# Patient Record
(demographics unavailable — no encounter records)

---

## 2025-03-05 NOTE — CARDIOLOGY SUMMARY
[de-identified] : 3/5/25: nsr, nonspecific ST and T wave changes 9/4/24: nsr, nonspecific ST and T wave changes 6/3/24: nsr, nonspecific ST and T wave changes 6/2/23: nsr, nonspecific T wave changes 1/10/24: nsr, nonspecific T wave changes 10/4/23: nsr, nonspecific T wave changes 8/23/23: nsr, nonspecific T wave changes 5/22/23: nsr, nonspecific T wave changes [de-identified] : 10/2/23: 1% PVCs [de-identified] : 8/21/23: negative for ischemia [de-identified] : 1/22/24: 1. Left ventricular cavity is normal. Left ventricular systolic function is normal with an ejection fraction of 67 % by Odonnell's method of disks. 2. Normal right ventricular cavity size and normal systolic function. 3. No significant valvular disease. 4. No prior echocardiogram is available for comparison.  4/4/23: 1. LV Ejection Fraction by Odonnell's Method with a biplane EF of 64 %.  2. Normal global left ventricular systolic function.  3. Trace mitral valve regurgitation.  4. Normal left atrial size.  5. Normal right atrial size. [de-identified] : 4/3/23: There is significant 2 vessel CAD. 80% prox LCx s/p successful IVUS guided PCI with balloon angioplasty and SMITH X 1. 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  [de-identified] : 2/7/24: 1. Right: proximal ICA no stenosis. 2. Left: proximal ICA no stenosis. 3. Vertebral arteries: antegrade flow bilaterally. 4. Subclavian arteries: no significant atherosclerosis bilaterally.

## 2025-03-05 NOTE — HISTORY OF PRESENT ILLNESS
[FreeTextEntry1] : JOE MEI is a 52 year old woman with CAD s/p Synergy XD 3.0 x 28 to pLCx on 8/30/21 and Synergy 3.0 x 12 to pLCx on 4/03/23, HTN, NIDDM, and HLD who presents for follow up today.  She has had multiple recent ER visits. Some relevant history is below:   When patient was admitted in 8/29/2021, her home antihypertensive medication was losartan 100 mg daily. After stent placement, she was discharged on the same dose of losartan as well as carvedilol and amlodipine. Per the , this is when her dizziness started. Her amlodipine was discontinued, and patient was changed to metoprolol and losartan and her dizziness resolved.   Patient represented in 4/2023 with unstable angina, underwent PCI to pLCx.  Per the , patient started taking isosorbide and redeveloped dizziness. The isosorbide was discontinued on 4/07/23 and patient was put on nifedipine 30 mg daily. On nifedipine 30 mg daily, BP was still uncontrolled and patient was still dizzy. Losartan was changed from 50 mg daily to losartan/HCTZ 100-25 mg daily, and Toprol was increased from 50 mg to 100 mg daily. Patient was also started on Ranexa 500 mg BID. After the last adjustments on 4/11/23, patient's BP became controlled but the dizziness persisted. The lowest HR and BP measured at home has been HR 70s and /65.   On 4/16/23, her dizziness was even worse, and she also developed pain in her left arm, prompting her to present to [facility] ED. Her orthostatics were negative. She was ruled out for ACS and discharged to follow up.   She went to the hospital on 8/20/23 for palpitations and syncope. She was ruled out for ACS. She had a normal echo and stress test and was discharged to follow up.   She has been to the ER twice in March 2024 for chest pain, shortness of breath, palpitations. She has been ruled out for ACS. An extensive workup has been done including a V/Q scan to rule out PE, which was negative.   Today, she is not feeling well at all. Her blood pressure has been in the systolic 150s at home. When this happens, she gets very anxious. She feels that the vein in her neck is going to explode. She also gets leg swelling. No chest pain or shortness of breath. No palpitations or syncope.

## 2025-03-05 NOTE — ASSESSMENT
[FreeTextEntry1] : 52 year old woman with CAD s/p Synergy XD 3.0 x 28 to pLCx on 8/30/21 and Synergy 3.0 x 12 to pLCx on 4/03/23, HTN, NIDDM, and HLD who presents for follow up today.  1. CAD: s/p Synergy XD 3.0 x 28 to pLCx on 8/30/21 and Synergy 3.0 x 12 to pLCx on 4/03/23. Her symptoms currently are not consistent with angina. She has residual RPL disease, 95%, however, currently has non-anginal sounding symptoms. - Continue aspirin 81mg lifelong - Continue atorvastatin and zetia; LDL goal is <70 - Continue metoprolol succinate 50mg daily for anti-anginal therapy  2. HTN: BP at goal today and but above goal at home and labile. Her goal is <130/80 mmHg. - Increase Losartan to 25mg BID - Recommended to keep a BP log at home and call me if BP is consistently above goal  3. HLD: LDL is at goal of <70. Continue atorva and zetia. Her LFTs are slightly high but will monitor for now.   The secondary prevention of heart disease was discussed in detail with the patient, including adhering to a heart healthy, plant based, or Mediterranean diet, and the importance of 30 minutes of moderate intensity activity for 30 minutes, 5 times a week. All the patient's questions were answered.  Have advised the patient and her son that if she exhibits signs and symptoms of a heart attack, she should go to the ER. They verbalized understanding.  RTC in 4-6 weeks.

## 2025-04-30 NOTE — HISTORY OF PRESENT ILLNESS
[de-identified] : Ms. JOE MEI is a 52 year  old woman. She presented to the office for the first time on 04/30/2025 , her primary is DAVID MICHEL .  tele phone visit gi symptoms has been there for many years us no gallstoens hida scan good ef gastroparesis  "She has a history of diabetes mellitus and then has also been diagnosed with gastroparesis and initially she was on Reglan and then subsequently as it was not working and has undergone EGD with Botox injection which helped her. Lately she was on on and off on Ozempic due to diabetes mellitus. Diabetes had flared up after back injections and for the past 4 years she has been off and on Ozempic. 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 I did a EGD with Botox injection in the prepyloric area which helped her transiently and then she has recurrence of her symptoms. She was evaluated in the hospital again and imaging was unimpressive. She was started on Reglan and nortriptyline and was discharged home. As per the  she became very lethargic with nortriptyline 25 mg dose and he tried to even reduce the dose but she was not better with that. She is on PPI in the morning and she takes liquid Reglan. I have reviewed all the imaging."  also had foot pain

## 2025-04-30 NOTE — ASSESSMENT
[FreeTextEntry1] : Ms. JOE MEI is a 52 year  old woman. She presented to the office for the first time on 04/30/2025 , her primary is DAVID MICHEL .  tele phone visit gi symptoms has been there for many years us no gallstoens hida scan good ef gastroparesis  "She has a history of diabetes mellitus and then has also been diagnosed with gastroparesis and initially she was on Reglan and then subsequently as it was not working and has undergone EGD with Botox injection which helped her. Lately she was on on and off on Ozempic due to diabetes mellitus. Diabetes had flared up after back injections and for the past 4 years she has been off and on Ozempic. 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 I did a EGD with Botox injection in the prepyloric area which helped her transiently and then she has recurrence of her symptoms. She was evaluated in the hospital again and imaging was unimpressive. She was started on Reglan and nortriptyline and was discharged home. As per the  she became very lethargic with nortriptyline 25 mg dose and he tried to even reduce the dose but she was not better with that. She is on PPI in the morning and she takes liquid Reglan. I have reviewed all the imaging."  also had foot pain   impression : vague gi i pain   gastroparesis vs goo will get ugi with small bowel series  We explained in great detail the pathophysiology of the disease process. We don diagrams and discussed the workup for diagnosis and management. The various options were explained to the patient. The Risk , benefit and alternatives were discussed. We discussed recovery and possible complications. The Post operative care was explained to the patient. She was counselled on diet , exercise and wound care. We discussed the pathology and surgery with her.  Counselling: The issue of increased BMI and weight was explained to the patient. We discussed how reducing weight before surgery can improve the outcomes of surgery. The surgery is more precise, less blood loss, complication and duration. The recovery is also easier, with reduced wound complications including infection.  She was counselled on diet and exercise.  We discussed the pathology and surgery with her.   We discussed for [id number], including her present medical conditions, medications and if they need to be changed, the medications she is on and various surgical and non-surgical options. This time excludes any separably reportable services (and /or teaching). The Risk, benefit and alternatives were discussed. We discussed recovery and possible complications. her radiological images and labs were independently reviewed in the PACS by me. The Images were reviewed with her.   We discussed the importance of close follow up. We informed that she needs to follow up after test . We also informed that she can call us if anything changes or has any questions.     Chinmay Nichole MD Minimally Invasive Surgery Foregut and [id number]-Sbflqrt Surgery  [facility]. [address], [address], [place] [address] Phone: [phone] Fax: [phone]

## 2025-07-23 NOTE — ASSESSMENT
[FreeTextEntry1] : 54 y/o F presents for LLE pain on ambulation, had arterial duplex that showed no significant arterial disease in the LLE. She has been doing physical therapy and symptoms are improved now.  Duplex reviewed. No vascular surgical intervention. Symptoms likely musculoskeletal. F/u as needed.   I, Dr. Neville, personally performed the evaluation and management (E/M) services for this established patient who presents today with (a) new problem(s)/exacerbation of (an) existing condition(s). That E/M includes conducting the clinically appropriate interval history &/or exam, assessing all new/exacerbated conditions, and establishing a new plan of care. Today, my STANFORD, Elisha], was here to observe my evaluation and management service for this new problem/exacerbated condition and follow the plan of care established by me going forward.  Thank you for allowing me to participate in the care of your patient.   Sincerely,   Cheng Neville MD, RPVI, FACS Associate Professor of Surgery , [facility] Director of Limb Salvage Surgery [facility] [employer] [facility] at hospitals/[facility]

## 2025-07-23 NOTE — HISTORY OF PRESENT ILLNESS
[FreeTextEntry1] : 52 y/o F presents for LLE pain on ambulation, had arterial duplex that showed no significant arterial disease in the LLE. She has been doing physical therapy and symptoms are improved now.

## 2025-07-23 NOTE — PHYSICAL EXAM
[2+] : left 2+ [Varicose Veins Of Lower Extremities] : bilaterally [Ankle Swelling On The Right] : mild [Ankle Swelling (On Exam)] : not present [] : not present